# Patient Record
Sex: FEMALE | Race: WHITE
[De-identification: names, ages, dates, MRNs, and addresses within clinical notes are randomized per-mention and may not be internally consistent; named-entity substitution may affect disease eponyms.]

---

## 2019-10-24 ENCOUNTER — APPOINTMENT (OUTPATIENT)
Dept: DERMATOLOGY | Facility: CLINIC | Age: 84
End: 2019-10-24
Payer: MEDICARE

## 2019-10-24 VITALS — WEIGHT: 125 LBS | BODY MASS INDEX: 23.6 KG/M2 | HEIGHT: 61 IN

## 2019-10-24 PROCEDURE — 99203 OFFICE O/P NEW LOW 30 MIN: CPT

## 2019-10-24 RX ORDER — IVERMECTIN 3 MG/1
3 TABLET ORAL
Qty: 10 | Refills: 1 | Status: ACTIVE | COMMUNITY
Start: 2019-10-24 | End: 1900-01-01

## 2019-10-24 RX ORDER — LOVASTATIN 40 MG/1
TABLET ORAL
Refills: 0 | Status: ACTIVE | COMMUNITY

## 2019-10-24 RX ORDER — ACYCLOVIR 200 MG/5ML
SUSPENSION ORAL
Refills: 0 | Status: ACTIVE | COMMUNITY

## 2019-10-24 RX ORDER — LINAGLIPTIN 5 MG/1
5 TABLET, FILM COATED ORAL
Refills: 0 | Status: ACTIVE | COMMUNITY

## 2019-10-24 NOTE — PHYSICAL EXAM
[Alert] : alert [Oriented x 3] : ~L oriented x 3 [Well Nourished] : well nourished [Eyelids] : Eyelids [Nose] : Nose [Face] : Face [Ears] : Ears [Lips] : Lips [FreeTextEntry3] : Mild ill-defined excoriations present, especially near the lateral elbows bilaterally\par Also present on the upper back and abdomen\par No rash seen\par \par Stroke test-negative

## 2019-10-24 NOTE — ASSESSMENT
[FreeTextEntry1] : Marked pruritus of uncertain etiology as patient does not have a rash\par ? Related to leukemia

## 2019-10-24 NOTE — HISTORY OF PRESENT ILLNESS
[FreeTextEntry1] : Itching [de-identified] : First visit for 87-year-old white female with a few month history of diffuse itching. Self treated with Benadryl cream and 1% hydrocortisone cream without improvement. No previous episodes. No new medications. No one else at home is itchy.\par Note-itching began while patient was in [place].\par The patient has a type of leukemia, ? CML - present for 7 years. On [place].

## 2019-11-07 ENCOUNTER — APPOINTMENT (OUTPATIENT)
Dept: DERMATOLOGY | Facility: CLINIC | Age: 84
End: 2019-11-07
Payer: MEDICARE

## 2019-11-07 DIAGNOSIS — L29.9 PRURITUS, UNSPECIFIED: ICD-10-CM

## 2019-11-07 DIAGNOSIS — L30.8 OTHER SPECIFIED DERMATITIS: ICD-10-CM

## 2019-11-07 PROCEDURE — 99213 OFFICE O/P EST LOW 20 MIN: CPT

## 2019-11-07 RX ORDER — BETAMETHASONE DIPROPIONATE 0.5 MG/ML
0.05 LOTION, AUGMENTED TOPICAL
Qty: 1 | Refills: 5 | Status: ACTIVE | COMMUNITY
Start: 2019-11-07 | End: 1900-01-01

## 2019-11-07 RX ORDER — KETOCONAZOLE 20.5 MG/ML
2 SHAMPOO, SUSPENSION TOPICAL
Qty: 1 | Refills: 5 | Status: ACTIVE | COMMUNITY
Start: 2019-11-07 | End: 1900-01-01

## 2019-11-07 NOTE — PHYSICAL EXAM
[Alert] : alert [Oriented x 3] : ~L oriented x 3 [Well Nourished] : well nourished [Face] : Face [Nose] : Nose [Ears] : Ears [Eyelids] : Eyelids [Lips] : Lips [FreeTextEntry3] : Scalp: Mild diffuse adherent scaling noted\par Left shoulder: Linear crusted excoriations with faint underlying pedis and scaling present\par Right shoulder: Clear

## 2019-11-07 NOTE — HISTORY OF PRESENT ILLNESS
[FreeTextEntry1] : Itching [de-identified] : Followup visit for 87-year-old Portuguese female first seen by me on October 24, 2019, with a few month history of diffuse itching.  At that visit it was noted that the patient had no rash and had a stroke test was negative.  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 ivermectin x2 and triamcinolone cream 0.1% in [employer] lotion.\par Itching has improved although she is still itching in the scalp and shoulders.\par Likes 1% hydrocortisone cream and Benadryl cream\par

## 2020-01-07 ENCOUNTER — RX RENEWAL (OUTPATIENT)
Age: 85
End: 2020-01-07

## 2020-01-07 RX ORDER — TRIAMCINOLONE ACETONIDE 1 MG/G
0.1 CREAM TOPICAL 4 TIMES DAILY
Qty: 1 | Refills: 2 | Status: ACTIVE | COMMUNITY
Start: 2019-10-24 | End: 1900-01-01

## 2020-02-10 ENCOUNTER — APPOINTMENT (OUTPATIENT)
Dept: DERMATOLOGY | Facility: CLINIC | Age: 85
End: 2020-02-10